# Patient Record
Sex: MALE | Race: BLACK OR AFRICAN AMERICAN | NOT HISPANIC OR LATINO | Employment: FULL TIME | URBAN - METROPOLITAN AREA
[De-identification: names, ages, dates, MRNs, and addresses within clinical notes are randomized per-mention and may not be internally consistent; named-entity substitution may affect disease eponyms.]

---

## 2024-07-26 ENCOUNTER — OFFICE VISIT (OUTPATIENT)
Dept: FAMILY MEDICINE | Facility: CLINIC | Age: 24
End: 2024-07-26
Payer: COMMERCIAL

## 2024-07-26 VITALS
OXYGEN SATURATION: 98 % | HEART RATE: 80 BPM | BODY MASS INDEX: 19.4 KG/M2 | HEIGHT: 71 IN | TEMPERATURE: 99 F | WEIGHT: 138.56 LBS

## 2024-07-26 DIAGNOSIS — M54.9 BACK PAIN, UNSPECIFIED BACK LOCATION, UNSPECIFIED BACK PAIN LATERALITY, UNSPECIFIED CHRONICITY: ICD-10-CM

## 2024-07-26 DIAGNOSIS — Z00.00 PHYSICAL EXAM: Primary | ICD-10-CM

## 2024-07-26 DIAGNOSIS — Z77.120 MOLD EXPOSURE: ICD-10-CM

## 2024-07-26 DIAGNOSIS — M54.9 DORSALGIA, UNSPECIFIED: ICD-10-CM

## 2024-07-26 PROCEDURE — 1159F MED LIST DOCD IN RCRD: CPT | Mod: CPTII,S$GLB,, | Performed by: FAMILY MEDICINE

## 2024-07-26 PROCEDURE — 99999 PR PBB SHADOW E&M-NEW PATIENT-LVL V: CPT | Mod: PBBFAC,,, | Performed by: FAMILY MEDICINE

## 2024-07-26 PROCEDURE — 3008F BODY MASS INDEX DOCD: CPT | Mod: CPTII,S$GLB,, | Performed by: FAMILY MEDICINE

## 2024-07-26 PROCEDURE — 99385 PREV VISIT NEW AGE 18-39: CPT | Mod: 25,S$GLB,, | Performed by: FAMILY MEDICINE

## 2024-07-26 PROCEDURE — 99213 OFFICE O/P EST LOW 20 MIN: CPT | Mod: 25,S$GLB,, | Performed by: FAMILY MEDICINE

## 2024-07-26 PROCEDURE — 90715 TDAP VACCINE 7 YRS/> IM: CPT | Mod: S$GLB,,, | Performed by: FAMILY MEDICINE

## 2024-07-26 PROCEDURE — 1160F RVW MEDS BY RX/DR IN RCRD: CPT | Mod: CPTII,S$GLB,, | Performed by: FAMILY MEDICINE

## 2024-07-26 PROCEDURE — 90471 IMMUNIZATION ADMIN: CPT | Mod: S$GLB,,, | Performed by: FAMILY MEDICINE

## 2024-07-28 NOTE — PROGRESS NOTES
Subjective:       Patient ID: Valentin Stahl is a 23 y.o. male.    Chief Complaint: Establish Care and Annual Exam    Here for new patient visit.      Social history smokes but rarely.  No significant alcohol intake.  Does exercise.  Works at [employer].      Family history glaucoma in father.  Grandfather prostate cancer.  Maternal uncle prostate cancer.  Breast cancer in the family.  Mom and dad are healthy.    Immunizations.  Needs TD AP.    Past medical history no surgeries no medical illnesses no known drug allergies and no medications.      Review of Systems   Constitutional: Negative.    HENT:  Positive for congestion.    Eyes: Negative.    Respiratory: Negative.     Cardiovascular: Negative.    Gastrointestinal: Negative.    Endocrine: Negative.    Genitourinary: Negative.    Musculoskeletal:  Positive for back pain.   Skin: Negative.    Neurological: Negative.    Hematological: Negative.    Psychiatric/Behavioral: Negative.         Objective:      Physical Exam  Vitals reviewed.   Constitutional:       Appearance: Normal appearance. He is well-developed and normal weight.   HENT:      Head: Normocephalic and atraumatic.      Right Ear: Tympanic membrane normal.      Left Ear: Tympanic membrane normal.      Nose: Nose normal.      Mouth/Throat:      Mouth: Mucous membranes are moist.      Pharynx: No oropharyngeal exudate.   Eyes:      Conjunctiva/sclera: Conjunctivae normal.      Pupils: Pupils are equal, round, and reactive to light.   Neck:      Vascular: No carotid bruit.   Cardiovascular:      Rate and Rhythm: Normal rate and regular rhythm.      Pulses: Normal pulses.      Heart sounds: Normal heart sounds. No murmur heard.     No gallop.   Pulmonary:      Effort: Pulmonary effort is normal.      Breath sounds: Normal breath sounds.   Abdominal:      General: Bowel sounds are normal.      Palpations: Abdomen is soft. There is no mass.      Tenderness: There is no abdominal tenderness.    Musculoskeletal:         General: Normal range of motion.      Cervical back: Normal range of motion.      Comments: Straight leg raising is negative.  Good range of motion of back without pain.   Skin:     General: Skin is warm and dry.   Neurological:      General: No focal deficit present.      Mental Status: He is alert and oriented to person, place, and time.   Psychiatric:         Mood and Affect: Mood normal.         Behavior: Behavior normal.         Assessment:       1. Physical exam    2. Back pain, unspecified back location, unspecified back pain laterality, unspecified chronicity    3. Adult BMI <19 kg/sq m    4. Dorsalgia, unspecified    5. Mold exposure        Plan:       Physical exam  -     CBC Auto Differential; Future; Expected date: 07/26/2024  -     Comprehensive Metabolic Panel; Future; Expected date: 07/26/2024  -     Lipid Panel; Future; Expected date: 07/26/2024  -     ABO/Rh; Future; Expected date: 07/26/2024    Back pain, unspecified back location, unspecified back pain laterality, unspecified chronicity  -     X-Ray Lumbar Spine 5 View; Future; Expected date: 07/26/2024    Adult BMI <19 kg/sq m    Dorsalgia, unspecified    Mold exposure  -     Ambulatory referral/consult to Allergy; Future; Expected date: 08/02/2024    Other orders  -     (In Office Administered) Tdap Vaccine    TD AP today.  CBC CMP and lipids.    In addition to routine physical.  Having some issues with mold.  But home about 2 years ago roof at leak then that was repaired but he has found mold in the ceiling of there for year now.  Roof apparently as been leaking recently unknown if it has been ongoing since the repairs.  He is getting some congestion some headaches and fatigue.  Been going on for couple of months now.  No coughing.  Wife lost pregnancy 5 months in.   He also has some back pain.  Has been told previously that is back was not straight.  No radiation into the legs just lower back.  Does work at the post  office.    Physical examination.  Tympanic membranes without erythema nose little congested.  Pharynx without erythema neck is supple.  Chest clear.  Heart regular rate rhythm.  Back nontender.  Straight.  Good range of motion without pain.  Straight leg raising is negative.  Deep tender reflexes are 2+ in the knees in the ankles.  Good motor strength in the lower extremities.      Impression.  Lower back pain.  Upper respiratory congestion.      Plan refer him to allergist for further evaluation.  Get an x-ray of his lumbar spine.  Also request a type and Rh.